# Patient Record
(demographics unavailable — no encounter records)

---

## 2025-01-28 NOTE — PHYSICAL EXAM
[TextEntry] : Constitutional: Well  developed, well appearing, not in acute distress Head: Normocephalic, no lesions Eyes: PERRLA, conjunctiva is NL, clear Ear: Ear canal is normal, tympanic membrane is intact Nose: Nasal turbinates are NL Throat: Clear, no exudates, no lesions Neck: Supple, no masses Chest: Lungs are clear, no rales, no rhonchi, no wheezing Heart: Regular rate, no murmurs, no rubs, no gallops Breast: b/l breast is symmetric, areola and nipple is NL, breast is multinodular, dense. Axillary LN is NL b/l Abdomen: Soft, no tenderness, no masses, bowel sounds are normal : No CVAT Extremeties: FROM, no deformities, no edema Musculoskeletal: has no tenderness of the spine, ROM is NL Skin: No rashes Hads an acne scars on the face Neuro: AAO x 3, no focal neurological deficit. Psychiatric: oriented to person, place, and time and insight and judgment were intact.

## 2025-01-28 NOTE — REVIEW OF SYSTEMS
[TextEntry] : Constitutional: Denies fever, c/o  fatigue,  has some recent changes in the weight(gain weight) Head: Denies headache, dizziness Eyes: Denies diplopia, tearing or pain Ears: Denies earache, tinnitus, hearing loss Nose: Denies nasal obstruction,  epistaxis Throat: Denies throat pain Neck: Denies stiffness, muscle tenderness Chest: Denies cough,  wheezing, chest congestion, SOB on exertion CV: Denies chest pain, palpitation GI: Denies abdominal pain, constipation, heartburn Genitourinary: Denies dysuria, urinary urgency Musculoskeletal: Denies joint pain Neuro: Denies changes in mental status Psychiatric: Denies depressive symptoms, change in sleep habits, changes in thought content

## 2025-01-28 NOTE — PAST MEDICAL HISTORY
[Menstruating] : menstruating [Menarche Age ____] : age at menarche was [unfilled] [Definite ___ (Date)] : the last menstrual period was [unfilled] [Regular Cycle Intervals] : have been regular [Total Preg ___] : G[unfilled] [Live Births ___] : P[unfilled]  [Living ___] : Living: [unfilled]

## 2025-01-28 NOTE — HEALTH RISK ASSESSMENT
[Good] : ~his/her~  mood as  good [No] : In the past 12 months have you used drugs other than those required for medical reasons? No [No falls in past year] : Patient reported no falls in the past year [Little interest or pleasure doing things] : 1) Little interest or pleasure doing things [Feeling down, depressed, or hopeless] : 2) Feeling down, depressed, or hopeless [0] : 2) Feeling down, depressed, or hopeless: Not at all (0) [PHQ-2 Negative - No further assessment needed] : PHQ-2 Negative - No further assessment needed [Patient reported PAP Smear was normal] : Patient reported PAP Smear was normal [HIV Test offered] : HIV Test offered [Hepatitis C test offered] : Hepatitis C test offered [None] : None [With Family] : lives with family [# of Members in Household ___] :  household currently consist of [unfilled] member(s) [Unemployed] : unemployed [Graduate School] : graduate school [] :  [# Of Children ___] : has [unfilled] children [Sexually Active] : sexually active [Feels Safe at Home] : Feels safe at home [Fully functional (bathing, dressing, toileting, transferring, walking, feeding)] : Fully functional (bathing, dressing, toileting, transferring, walking, feeding) [Fully functional (using the telephone, shopping, preparing meals, housekeeping, doing laundry, using] : Fully functional and needs no help or supervision to perform IADLs (using the telephone, shopping, preparing meals, housekeeping, doing laundry, using transportation, managing medications and managing finances) [Reports changes in vision] : Reports changes in vision [Smoke Detector] : smoke detector [Carbon Monoxide Detector] : carbon monoxide detector [Safety elements used in home] : safety elements used in home [Seat Belt] :  uses seat belt [Patient/Caregiver not ready to engage] : , patient/caregiver not ready to engage [Never] : Never [NO] : No [de-identified] : no [de-identified] : no [Audit-CScore] : 0 [de-identified] : no [de-identified] : regular [[id number]] : 0 [Change in mental status noted] : No change in mental status noted [Language] : denies difficulty with language [Behavior] : denies difficulty with behavior [Learning/Retaining New Information] : denies difficulty learning/retaining new information [Handling Complex Tasks] : denies difficulty handling complex tasks [Reports changes in hearing] : Reports no changes in hearing [Reports changes in dental health] : Reports no changes in dental health [Sunscreen] : does not use sunscreen [PapSmearDate] : 2022 [de-identified] : Wear glasses for a distance

## 2025-01-28 NOTE — HISTORY OF PRESENT ILLNESS
[FreeTextEntry1] : New patient [de-identified] : Ms. Mary Ann Deshpande 32 year  female  with a  no significant PMH   present to the office for a physical exam and establish care with a new PCP.  Patient feels good, sometimes has SOB on exertion. Denies chest pain.  Also c/o muscle cramps on the lower extremeties

## 2025-01-28 NOTE — PLAN
[FreeTextEntry1] : Ms. Mary Ann Deshpande 32 year  female  with a  no significant PMH   present to the office for a physical exam and establish care with a new PCP.   Well adult exam is performed. Recommend  to do a blood test today, further management will depend on the blood test results.  SOB on exertion, tachycardia - EKG was done and reviewed NSR 86 bpm, T inversion in lead III, V1 Will do blood test to check for anemia, TSH level. Recommend to f/u with cardiologist if symptoms will persist F/u with GYN -0 screening pap test Do  breast u/s - dense breast. Recommend to do a self breast exam monthly  RTC to f/u in 2 wks. Patient is verbalized understanding